# Patient Record
Sex: FEMALE | Race: WHITE | NOT HISPANIC OR LATINO | URBAN - NONMETROPOLITAN AREA
[De-identification: names, ages, dates, MRNs, and addresses within clinical notes are randomized per-mention and may not be internally consistent; named-entity substitution may affect disease eponyms.]

---

## 2019-05-16 NOTE — PATIENT DISCUSSION
Recommend starting AREDs vitamins and amsler grid testing regularly. Monitor for changes. Advised patient to call our office with decreased vision or increased distortion.

## 2019-12-05 NOTE — PATIENT DISCUSSION
Continue Ocuvite bi weekly  vitamins and amsler grid testing regularly. Monitor for changes. Advised patient to call our office with decreased vision or increased distortion.

## 2020-12-07 NOTE — PATIENT DISCUSSION
Patient may wait for springtime to proceed with surgery. She might prefer to be COVID vaccinated prior to cataract surgery.

## 2020-12-07 NOTE — PATIENT DISCUSSION
I explained to the patient that even with successful cataract surgery, the vision may still be limited by the pre-existing AMD.

## 2021-06-03 ENCOUNTER — IMPORTED ENCOUNTER (OUTPATIENT)
Dept: URBAN - NONMETROPOLITAN AREA CLINIC 1 | Facility: CLINIC | Age: 36
End: 2021-06-03

## 2021-06-03 PROBLEM — H52.13: Noted: 2021-06-03

## 2021-06-03 PROBLEM — H52.223: Noted: 2021-06-03

## 2021-06-03 PROCEDURE — 92004 COMPRE OPH EXAM NEW PT 1/>: CPT

## 2021-06-03 PROCEDURE — 92015 DETERMINE REFRACTIVE STATE: CPT

## 2021-06-03 NOTE — PATIENT DISCUSSION
Astigmatism / Hyperopia / Presbyopia OU - Discussed diagnosis in detail with patient- New Glasses and CL RX given today- Continue to monitor- RTC 1 year complete

## 2021-11-02 NOTE — PROCEDURE NOTE: CLINICAL
PROCEDURE NOTE: Avastin () OD. Diagnosis: Neovascular AMD with Active CNV. Anesthesia: Lidocaine 4%. Prep: Betadine Drops. Prior to injection, risks/benefits/alternatives discussed including infection, loss of vision, hemorrhage, cataract, glaucoma, retinal tears or detachment. The off-label status of Intravitreal Avastin also was reviewed. The patient wished to proceed with treatment. Topical anesthesia was induced with Alcaine. Additional anesthesia was achieved using drop(s) or injection checked above. a drop of Povidone-iodine 5% ophthalmic solution was instilled over the injection site and in the inferior fornix. Betadine prep was performed. Using the syringe provided, Avastin 1.25 mg in 0.05 cc was injected into the vitreous cavity. The needle was passed 3.0 mm posterior to the limbus in pseudophakic patients, and 3.5 mm posterior to the lumbus in phakic patients. The remainder of the Avastin in the single-use vial was then discarded in a medical waste disposal container. Unused medication was discarded. Patient tolerated procedure well. There were no complications. Injection time: 11:17PM. Post-op instructions given. Expiration Date: 12/27/2021. The patient was instructed to return for re-evaluation in approximately 4-12 weeks depending on his/her condition and was told to call immediately if vision decreases and/or if his/her eye becomes red, painful, and/or light sensitive. The patient was instructed to go to the emergency room or call 911 if unable to reach the doctor within an hour or two of trying or calling. The patient was instructed to use Artificial Tears q.i.d. p.r.n for comfort. Holli Harvey

## 2021-11-02 NOTE — PATIENT DISCUSSION
11/2/21: Continue to MONITOR CLOSELY to determine the need for TREATMENT and INCREASE/DECREASE in length of time till next follow up visit.

## 2021-12-14 NOTE — PROCEDURE NOTE: CLINICAL
PROCEDURE NOTE: Avastin () OD. Diagnosis: Neovascular AMD with Active CNV. Anesthesia: Lidocaine 4%. Prep: Betadine Drops. Prior to injection, risks/benefits/alternatives discussed including infection, loss of vision, hemorrhage, cataract, glaucoma, retinal tears or detachment. The off-label status of Intravitreal Avastin also was reviewed. The patient wished to proceed with treatment. Topical anesthesia was induced with Alcaine. Additional anesthesia was achieved using drop(s) or injection checked above. a drop of Povidone-iodine 5% ophthalmic solution was instilled over the injection site and in the inferior fornix. Betadine prep was performed. Using the syringe provided, Avastin 1.25 mg in 0.05 cc was injected into the vitreous cavity. The needle was passed 3.0 mm posterior to the limbus in pseudophakic patients, and 3.5 mm posterior to the lumbus in phakic patients. The remainder of the Avastin in the single-use vial was then discarded in a medical waste disposal container. Unused medication was discarded. Patient tolerated procedure well. There were no complications. Injection time: 302PM. Post-op instructions given. Expiration Date: 2/20/2022. The patient was instructed to return for re-evaluation in approximately 4-12 weeks depending on his/her condition and was told to call immediately if vision decreases and/or if his/her eye becomes red, painful, and/or light sensitive. The patient was instructed to go to the emergency room or call 911 if unable to reach the doctor within an hour or two of trying or calling. The patient was instructed to use Artificial Tears q.i.d. p.r.n for comfort. Kina Monroe

## 2021-12-14 NOTE — PATIENT DISCUSSION
"DISCUSSED THE DX, IMAGES, PROGNOSIS AND TX PLAN. RISK OF VL"" MOD/HIGH. "
11/2/21: Continue to MONITOR CLOSELY to determine the need for TREATMENT and INCREASE/DECREASE in length of time till next follow up visit.
ARTIFICIAL TEARS to affected eye(s) as needed.
AS ABOVE.
BMI Within normal limits, continue current management.
Based on today's exam, diagnostic studies, and/or review of records, the determination was made for treatment today. AVASTIN 1.25mg recommended TODAY after discussion of benefits, risks and alternatives. The injection was given without complication and tolerated well by the patient. Discussed risks, benefits, and alternatives of Intravitreal Avastin. Patient understands and accepts that the Avastin is being used off label. Patient understand and accepts that the Avastin comes from a formulating pharmacy, which may increase the risk of sever vision threatening intraocular infections. Post-injection instructions were reviewed and understood by the patient. Procedure was performed without complications. Instructed to call immediately if any new distortion, blurring, decreased vision or eye pain.
Does NOT APPEAR VISUALLY SIGNIFICANT.
MONITOR response to TREATMENT.
My findings and recommendations are based on patient's symptoms, eye exam, diagnostic testing, and records.
NON SMOKER.
No retinal holes or tears seen on exam. Recommended OBSERVATION. We reviewed the signs and symptoms of retinal tear/retinal detachment and the importance of prompt evaluation should there be increasing floaters, new flashing lights, or decreasing peripheral vision in either eye at any time. Patient understands condition, prognosis and need for follow up care.
No retinal tears or retinal detachment seen on clinical exam today. Reviewed the signs and symptoms of retinal tear/retinal detachment and the importance of calling for prompt evaluation should there be increasing floaters, new flashing lights, or decreasing peripheral vision in either eye at any time. Observation recommended.
Personally reviewed patients records from referring Physician.
Recommended OBSERVATION and MONITORING for change.
Recommended OBSERVATION and continued MONITORING for progression.
Recommended observation.
Well positioned.
Over the counter Tylenol and Motrin for pain control. No tub baths or pools for 2 days. Okay to bathe after 48 hours. Please call the office to follow-up in 6 weeks with Dr. Carter.

## 2022-01-25 NOTE — PATIENT DISCUSSION
1/25/22: PERSISTENT CME WITH SUBOPTIMAL RESPONSE TO AVASTIN. IF NO FURTHER IMPROVEMENT, CONSIDER SWITCHING AGENTS.

## 2022-01-25 NOTE — PROCEDURE NOTE: CLINICAL
PROCEDURE NOTE: Avastin () OD. Diagnosis: Neovascular AMD with Active CNV. Anesthesia: Lidocaine 4%. Prep: Betadine Drops. Prior to injection, risks/benefits/alternatives discussed including infection, loss of vision, hemorrhage, cataract, glaucoma, retinal tears or detachment. The off-label status of Intravitreal Avastin also was reviewed. The patient wished to proceed with treatment. Topical anesthesia was induced with Alcaine. Additional anesthesia was achieved using drop(s) or injection checked above. a drop of Povidone-iodine 5% ophthalmic solution was instilled over the injection site and in the inferior fornix. Betadine prep was performed. Using the syringe provided, Avastin 1.25 mg in 0.05 cc was injected into the vitreous cavity. The needle was passed 3.0 mm posterior to the limbus in pseudophakic patients, and 3.5 mm posterior to the lumbus in phakic patients. The remainder of the Avastin in the single-use vial was then discarded in a medical waste disposal container. Unused medication was discarded. Patient tolerated procedure well. There were no complications. Injection time: 912AM. Post-op instructions given. Expiration Date: 4/26/2022. The patient was instructed to return for re-evaluation in approximately 4-12 weeks depending on his/her condition and was told to call immediately if vision decreases and/or if his/her eye becomes red, painful, and/or light sensitive. The patient was instructed to go to the emergency room or call 911 if unable to reach the doctor within an hour or two of trying or calling. The patient was instructed to use Artificial Tears q.i.d. p.r.n for comfort. Catalino Bolanos

## 2022-03-11 NOTE — PROCEDURE NOTE: CLINICAL

## 2022-04-10 ASSESSMENT — TONOMETRY
OS_IOP_MMHG: 13
OD_IOP_MMHG: 13

## 2022-04-10 ASSESSMENT — VISUAL ACUITY
OD_SC: 20/20
OS_SC: 20/20

## 2022-04-19 NOTE — PROCEDURE NOTE: CLINICAL

## 2022-04-19 NOTE — PATIENT DISCUSSION
4/19/22: The patient is at high risk for a choroidal neovascular membrane. NO CNV SEEN TODAY. Dry ARMD is responsible for some decrease in vision.

## 2022-05-24 NOTE — PROCEDURE NOTE: CLINICAL

## 2022-05-24 NOTE — PATIENT DISCUSSION
5/24/22: The patient is at high risk for a choroidal neovascular membrane. NO CNV SEEN TODAY. Dry ARMD is responsible for some decrease in vision.

## 2022-06-16 ENCOUNTER — COMPREHENSIVE EXAM (OUTPATIENT)
Dept: URBAN - NONMETROPOLITAN AREA CLINIC 1 | Facility: CLINIC | Age: 37
End: 2022-06-16

## 2022-06-16 DIAGNOSIS — H52.13: ICD-10-CM

## 2022-06-16 PROCEDURE — 92310 CONTACT LENS FITTING OU: CPT

## 2022-06-16 PROCEDURE — 92014 COMPRE OPH EXAM EST PT 1/>: CPT

## 2022-06-16 PROCEDURE — 92015 DETERMINE REFRACTIVE STATE: CPT

## 2022-06-16 ASSESSMENT — TONOMETRY
OD_IOP_MMHG: 15
OS_IOP_MMHG: 15

## 2022-06-16 ASSESSMENT — VISUAL ACUITY
OS_CC: 20/20
OD_CC: 20/20

## 2022-06-16 NOTE — PATIENT DISCUSSION
Astigmatism / Hyperopia / Presbyopia OU - Discussed diagnosis in detail with patient- New Glasses and CL RX given today- Continue to monitor- RTC 1 year complete.

## 2022-08-19 NOTE — PROCEDURE NOTE: CLINICAL

## 2022-08-19 NOTE — PATIENT DISCUSSION
1/25/22: PERSISTENT CME WITH SUBOPTIMAL RESPONSE TO AVASTIN. IF NO FURTHER IMPROVEMENT, CONSIDER SWITCHING AGENTS. no

## 2022-09-22 NOTE — PATIENT DISCUSSION
----- Message from Girish Zepeda sent at 6/24/2019  1:12 PM CDT -----  Regarding: Visit Follow-up Question  Contact: [phone]  Hi,     I need your help please call me. Girish is complaining about his back hurting him, the top left side.  He's in a IOP still stable today off [employer].

## 2022-10-14 NOTE — PROCEDURE NOTE: CLINICAL

## 2022-11-30 NOTE — PROCEDURE NOTE: CLINICAL

## 2023-01-13 NOTE — PROCEDURE NOTE: CLINICAL

## 2023-01-13 NOTE — PATIENT DISCUSSION
1/25/22: PERSISTENT CME WITH SUBOPTIMAL RESPONSE TO AVASTIN. IF NO FURTHER IMPROVEMENT, CONSIDER SWITCHING AGENTS. triple lumen

## 2023-02-20 NOTE — PATIENT DISCUSSION
Contacted patient. Notified of results.    Patient verbalized understanding and has no further questions or concerns.      Does NOT APPEAR VISUALLY SIGNIFICANT.

## 2023-06-19 ENCOUNTER — ESTABLISHED PATIENT (OUTPATIENT)
Dept: URBAN - NONMETROPOLITAN AREA CLINIC 1 | Facility: CLINIC | Age: 38
End: 2023-06-19

## 2023-06-19 DIAGNOSIS — H52.13: ICD-10-CM

## 2023-06-19 PROCEDURE — 92015 DETERMINE REFRACTIVE STATE: CPT

## 2023-06-19 PROCEDURE — 92014 COMPRE OPH EXAM EST PT 1/>: CPT

## 2023-06-19 PROCEDURE — 92310-E CONTACT LENS FITTING ESTABLISH PATIENT

## 2023-06-19 ASSESSMENT — VISUAL ACUITY
OD_CC: 20/22-1
OU_CC: 20/20
OS_CC: 20/20

## 2023-06-19 ASSESSMENT — TONOMETRY
OS_IOP_MMHG: 15
OD_IOP_MMHG: 15

## 2023-07-20 NOTE — PATIENT DISCUSSION
MONITOR response to TREATMENT. Ivermectin Counseling:  Patient instructed to take medication on an empty stomach with a full glass of water.  Patient informed of potential adverse effects including but not limited to nausea, diarrhea, dizziness, itching, and swelling of the extremities or lymph nodes.  The patient verbalized understanding of the proper use and possible adverse effects of ivermectin.  All of the patient's questions and concerns were addressed.
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
